# Patient Record
Sex: MALE
[De-identification: names, ages, dates, MRNs, and addresses within clinical notes are randomized per-mention and may not be internally consistent; named-entity substitution may affect disease eponyms.]

---

## 2021-04-19 PROBLEM — Z00.00 ENCOUNTER FOR PREVENTIVE HEALTH EXAMINATION: Status: ACTIVE | Noted: 2021-04-19

## 2021-04-20 ENCOUNTER — APPOINTMENT (OUTPATIENT)
Dept: UROLOGY | Facility: CLINIC | Age: 31
End: 2021-04-20

## 2021-07-15 ENCOUNTER — APPOINTMENT (OUTPATIENT)
Dept: UROLOGY | Facility: CLINIC | Age: 31
End: 2021-07-15

## 2021-07-23 ENCOUNTER — NON-APPOINTMENT (OUTPATIENT)
Age: 31
End: 2021-07-23

## 2021-07-29 ENCOUNTER — APPOINTMENT (OUTPATIENT)
Dept: UROLOGY | Facility: CLINIC | Age: 31
End: 2021-07-29
Payer: COMMERCIAL

## 2021-07-29 ENCOUNTER — OUTPATIENT (OUTPATIENT)
Dept: OUTPATIENT SERVICES | Facility: HOSPITAL | Age: 31
LOS: 1 days | End: 2021-07-29
Payer: COMMERCIAL

## 2021-07-29 DIAGNOSIS — Z86.79 PERSONAL HISTORY OF OTHER DISEASES OF THE CIRCULATORY SYSTEM: ICD-10-CM

## 2021-07-29 DIAGNOSIS — R35.0 FREQUENCY OF MICTURITION: ICD-10-CM

## 2021-07-29 LAB
ALBUMIN SERPL ELPH-MCNC: 4.9 G/DL
ALP BLD-CCNC: 76 U/L
ALT SERPL-CCNC: 14 U/L
ANION GAP SERPL CALC-SCNC: 11 MMOL/L
AST SERPL-CCNC: 18 U/L
BASOPHILS # BLD AUTO: 0.02 K/UL
BASOPHILS NFR BLD AUTO: 0.4 %
BILIRUB SERPL-MCNC: 1.8 MG/DL
BUN SERPL-MCNC: 12 MG/DL
CALCIUM SERPL-MCNC: 10.1 MG/DL
CHLORIDE SERPL-SCNC: 102 MMOL/L
CHOLEST SERPL-MCNC: 168 MG/DL
CO2 SERPL-SCNC: 26 MMOL/L
CREAT SERPL-MCNC: 0.87 MG/DL
EOSINOPHIL # BLD AUTO: 0.25 K/UL
EOSINOPHIL NFR BLD AUTO: 4.5 %
GLUCOSE SERPL-MCNC: 97 MG/DL
HCT VFR BLD CALC: 41.6 %
HDLC SERPL-MCNC: 47 MG/DL
HGB BLD-MCNC: 14.5 G/DL
IMM GRANULOCYTES NFR BLD AUTO: 0.2 %
LDLC SERPL CALC-MCNC: 105 MG/DL
LYMPHOCYTES # BLD AUTO: 2.56 K/UL
LYMPHOCYTES NFR BLD AUTO: 46 %
MAN DIFF?: NORMAL
MCHC RBC-ENTMCNC: 31.3 PG
MCHC RBC-ENTMCNC: 34.9 GM/DL
MCV RBC AUTO: 89.7 FL
MONOCYTES # BLD AUTO: 0.49 K/UL
MONOCYTES NFR BLD AUTO: 8.8 %
NEUTROPHILS # BLD AUTO: 2.23 K/UL
NEUTROPHILS NFR BLD AUTO: 40.1 %
NONHDLC SERPL-MCNC: 121 MG/DL
PLATELET # BLD AUTO: 274 K/UL
POTASSIUM SERPL-SCNC: 4.5 MMOL/L
PROT SERPL-MCNC: 7.1 G/DL
RBC # BLD: 4.64 M/UL
RBC # FLD: 12.6 %
SODIUM SERPL-SCNC: 139 MMOL/L
TRIGL SERPL-MCNC: 79 MG/DL
WBC # FLD AUTO: 5.56 K/UL

## 2021-07-29 PROCEDURE — 99204 OFFICE O/P NEW MOD 45 MIN: CPT | Mod: 25

## 2021-07-29 PROCEDURE — 76870 US EXAM SCROTUM: CPT | Mod: 26

## 2021-07-29 PROCEDURE — 93975 VASCULAR STUDY: CPT | Mod: 26

## 2021-07-29 PROCEDURE — 93975 VASCULAR STUDY: CPT

## 2021-07-29 PROCEDURE — 76870 US EXAM SCROTUM: CPT

## 2021-07-29 NOTE — ASSESSMENT
[FreeTextEntry1] : This pleasant  gentleman presents with primary subfertilty, impaired semen quality and bilateral varicoceles.  I have requested several baseline blood studies, a semen analysis and additional imaging.  Urine dip and microscopic analysis was requested. We discussed his evaluation today and possible options for therapy depending upon the results of his testing. I will make more specific recommendations after the results of the requested tests return.\par 1. Review blood studies and semen analysis on follow up\par 2. Discuss options for therapy on follow up\par \par Consultation: 40 minutes:  20 minutes reviewing his history and performing a physical examination.  20 minutes reviewing the ultrasound, writing for prescription medications and blood studies ,discussing treatment options and writing his note. There was also additional time in preparation for today's visit.\par

## 2021-07-29 NOTE — HISTORY OF PRESENT ILLNESS
[FreeTextEntry1] : Patient is a 31-year-old gentleman who presents with the chief complaint of impaired fertility and bilateral varicoceles for evaluation. The patient denies fevers, chills, nausea and/or vomiting and no unexplained weight loss.   I reviewed the questionnaire he had completed with him in detail.  His wife Cherelle, age 29, was not able to accompany him to the visit today. She has not had any prior pregnancies. As I understand her evaluation has been normal to date. They have been trying to achieve pregnancy for the past 3 months without conception   He has no known drug allergies.  His past medical history demonstrates no significant urologic issues (see patient questionnaire).  In his present occupation as a  he has no known toxin exposure.  He does not smoke and drinks only socially.  He has no known drug allergies.  His review of systems is non-contributory. His family history is not significant.\par

## 2021-07-29 NOTE — LETTER BODY
[FreeTextEntry1] : Dear ,\par \par Thank you for referring your patient Zeyad Bowles for consultation for varicoceles.  I have requested several baseline blood studies and a semen analysis. I will see the patient back in followup shortly and make further recommendations. I have attached a copy of my consultation note for your records.\par \par Thank you again for this kind referral. I will certainly keep you updated with further progress. Please do not hesitate to call me if you have any questions.\par \par Best regards,\par \par \par \par Ajay Rodriguez M.D., PhD\par Professor of Urology\par    [facility] [employer] [facility] \[facility]\""/[facility]\par  for Quality\par Director, Reproductive and Sexual Medicine\par    [facility]

## 2021-07-29 NOTE — PHYSICAL EXAM
[Penis Abnormality] : normal circumcised penis [FreeTextEntry1] : Bilateral varicoceles, larger on the left

## 2021-08-02 LAB
25(OH)D3 SERPL-MCNC: 31.8 NG/ML
ESTIMATED AVERAGE GLUCOSE: 94 MG/DL
FSH SERPL-MCNC: 5.6 IU/L
HBA1C MFR BLD HPLC: 4.9 %
LH SERPL-ACNC: 5 IU/L
TESTOST BND SERPL-MCNC: 12.4 PG/ML
TESTOSTERONE TOTAL S: 424 NG/DL

## 2021-08-18 ENCOUNTER — APPOINTMENT (OUTPATIENT)
Dept: HUMAN REPRODUCTION | Facility: CLINIC | Age: 31
End: 2021-08-18
Payer: COMMERCIAL

## 2021-08-18 PROCEDURE — 89322 SEMEN ANAL STRICT CRITERIA: CPT

## 2021-08-19 DIAGNOSIS — I86.1 SCROTAL VARICES: ICD-10-CM

## 2021-08-24 ENCOUNTER — APPOINTMENT (OUTPATIENT)
Dept: UROLOGY | Facility: CLINIC | Age: 31
End: 2021-08-24
Payer: COMMERCIAL

## 2021-08-24 DIAGNOSIS — I86.1 SCROTAL VARICES: ICD-10-CM

## 2021-08-24 PROCEDURE — 99214 OFFICE O/P EST MOD 30 MIN: CPT | Mod: 95

## 2021-08-24 NOTE — HISTORY OF PRESENT ILLNESS
[FreeTextEntry1] : The patient-doctor. relationship has been established in a face-to-face fashion on real-time video audio HIPAA compliant communication using telemedicine software. The patient was at home and the physician was in his office. The patient's identity has been confirmed.  The patient was previously emailed a copy of the telemedicine consent.  The patient has had a chance to review and has now given verbal consent and has requested care to be assessed and treated through telemedicine. The patient understands there may be limitations in this process and that they need not need further follow-up care in the office and/or hospital settings. We were unable to use the [employer] platform and an alternative platform was utilized.  The patient was at home and I was in the office.\par \par Verbal consent was given on Tuesday, August 24, 2021 at 10:10 AM by the patient.  It was requested by the physician.  A written consent was previously sent for the patient to sign and return.\par \par The patient returns to review his recent blood studies and to discuss options for therapy.\par \par PMH: Patient initially presented with the chief complaint of impaired fertility and bilateral varicoceles for evaluation. The patient denies fevers, chills, nausea and/or vomiting and no unexplained weight loss  His wife Cherelle, age 29, was not able to accompany him to the visit today. She has not had any prior pregnancies. As I understand her evaluation has been normal to date. They have been trying to achieve pregnancy for the past 3 months without conception   He has no known drug allergies.  His past medical history demonstrates no significant urologic issues (see patient questionnaire).  In his present occupation as a  he has no known toxin exposure.  He does not smoke and drinks only socially.  He has no known drug allergies.  His review of systems is non-contributory. His family history is not significant.\par

## 2021-08-24 NOTE — ASSESSMENT
[FreeTextEntry1] : The patient returns to review his recent blood studies and to discuss options for therapy. Semen analysis demonstrated a good number of sperm with good motility and decreased morphology of 1%.  His blood test demonstrated a hematocrit of 41.6% with an LH of 5 IU/L and an FSH of 5.6 IU/L.  His testosterone free and total were normal with a free testosterone of 12.4 pg/mL and a total testosterone of 424 ng/dL.\par \par His wife has not yet been evaluated by a reproductive endocrinologist.  I have suggested that she do so and gave her referrals.  I will then discuss the results of her evaluation with them in approximately 1 to 2 months.\par \par Telehealth Consultation: 30 minutes  10 minutes reviewing his history and discussing prior results.  20 minutes discussing various treatment option  and writing his note. There was also additional time in preparing for the visit and assisting the patient with technology issues he was having with the telehealth platform.

## 2022-11-04 ENCOUNTER — NON-APPOINTMENT (OUTPATIENT)
Age: 32
End: 2022-11-04

## 2025-01-05 ENCOUNTER — NON-APPOINTMENT (OUTPATIENT)
Age: 35
End: 2025-01-05

## 2025-01-17 ENCOUNTER — NON-APPOINTMENT (OUTPATIENT)
Age: 35
End: 2025-01-17